# Patient Record
Sex: MALE | Race: OTHER | Employment: OTHER | URBAN - METROPOLITAN AREA
[De-identification: names, ages, dates, MRNs, and addresses within clinical notes are randomized per-mention and may not be internally consistent; named-entity substitution may affect disease eponyms.]

---

## 2017-09-15 ENCOUNTER — APPOINTMENT (OUTPATIENT)
Dept: CT IMAGING | Age: 27
End: 2017-09-15
Attending: PHYSICIAN ASSISTANT
Payer: OTHER GOVERNMENT

## 2017-09-15 ENCOUNTER — HOSPITAL ENCOUNTER (EMERGENCY)
Age: 27
Discharge: HOME | End: 2017-09-15
Attending: FAMILY MEDICINE
Payer: OTHER GOVERNMENT

## 2017-09-15 VITALS
HEIGHT: 69 IN | OXYGEN SATURATION: 99 % | SYSTOLIC BLOOD PRESSURE: 136 MMHG | BODY MASS INDEX: 27.7 KG/M2 | WEIGHT: 187 LBS | HEART RATE: 81 BPM | TEMPERATURE: 98.4 F | RESPIRATION RATE: 16 BRPM | DIASTOLIC BLOOD PRESSURE: 77 MMHG

## 2017-09-15 DIAGNOSIS — R10.84 ABDOMINAL PAIN, GENERALIZED: Primary | ICD-10-CM

## 2017-09-15 DIAGNOSIS — R51.9 ACUTE NONINTRACTABLE HEADACHE, UNSPECIFIED HEADACHE TYPE: ICD-10-CM

## 2017-09-15 LAB
ALBUMIN SERPL-MCNC: 4.1 G/DL (ref 3.4–5)
ALBUMIN/GLOB SERPL: 1.1 {RATIO} (ref 0.8–1.7)
ALP SERPL-CCNC: 60 U/L (ref 45–117)
ALT SERPL-CCNC: 35 U/L (ref 16–61)
ANION GAP SERPL CALC-SCNC: 6 MMOL/L (ref 3–18)
APPEARANCE UR: CLEAR
AST SERPL-CCNC: 22 U/L (ref 15–37)
BASOPHILS # BLD: 0 K/UL (ref 0–0.06)
BASOPHILS NFR BLD: 0 % (ref 0–2)
BILIRUB SERPL-MCNC: 0.4 MG/DL (ref 0.2–1)
BILIRUB UR QL: NEGATIVE
BUN SERPL-MCNC: 14 MG/DL (ref 7–18)
BUN/CREAT SERPL: 13 (ref 12–20)
CALCIUM SERPL-MCNC: 9.5 MG/DL (ref 8.5–10.1)
CHLORIDE SERPL-SCNC: 102 MMOL/L (ref 100–108)
CO2 SERPL-SCNC: 32 MMOL/L (ref 21–32)
COLOR UR: YELLOW
CREAT SERPL-MCNC: 1.05 MG/DL (ref 0.6–1.3)
DIFFERENTIAL METHOD BLD: NORMAL
EOSINOPHIL # BLD: 0.1 K/UL (ref 0–0.4)
EOSINOPHIL NFR BLD: 2 % (ref 0–5)
ERYTHROCYTE [DISTWIDTH] IN BLOOD BY AUTOMATED COUNT: 13.6 % (ref 11.6–14.5)
GLOBULIN SER CALC-MCNC: 3.7 G/DL (ref 2–4)
GLUCOSE SERPL-MCNC: 96 MG/DL (ref 74–99)
GLUCOSE UR STRIP.AUTO-MCNC: NEGATIVE MG/DL
HCT VFR BLD AUTO: 45.8 % (ref 36–48)
HGB BLD-MCNC: 15.8 G/DL (ref 13–16)
HGB UR QL STRIP: NEGATIVE
KETONES UR QL STRIP.AUTO: NEGATIVE MG/DL
LEUKOCYTE ESTERASE UR QL STRIP.AUTO: NEGATIVE
LYMPHOCYTES # BLD: 1.8 K/UL (ref 0.9–3.6)
LYMPHOCYTES NFR BLD: 24 % (ref 21–52)
MCH RBC QN AUTO: 29.2 PG (ref 24–34)
MCHC RBC AUTO-ENTMCNC: 34.5 G/DL (ref 31–37)
MCV RBC AUTO: 84.5 FL (ref 74–97)
MONOCYTES # BLD: 0.8 K/UL (ref 0.05–1.2)
MONOCYTES NFR BLD: 10 % (ref 3–10)
NEUTS SEG # BLD: 4.8 K/UL (ref 1.8–8)
NEUTS SEG NFR BLD: 64 % (ref 40–73)
NITRITE UR QL STRIP.AUTO: NEGATIVE
PH UR STRIP: 7 [PH] (ref 5–8)
PLATELET # BLD AUTO: 195 K/UL (ref 135–420)
PMV BLD AUTO: 11.6 FL (ref 9.2–11.8)
POTASSIUM SERPL-SCNC: 4.2 MMOL/L (ref 3.5–5.5)
PROT SERPL-MCNC: 7.8 G/DL (ref 6.4–8.2)
PROT UR STRIP-MCNC: NEGATIVE MG/DL
RBC # BLD AUTO: 5.42 M/UL (ref 4.7–5.5)
SODIUM SERPL-SCNC: 140 MMOL/L (ref 136–145)
SP GR UR REFRACTOMETRY: 1.02 (ref 1–1.03)
UROBILINOGEN UR QL STRIP.AUTO: 0.2 EU/DL (ref 0.2–1)
WBC # BLD AUTO: 7.4 K/UL (ref 4.6–13.2)

## 2017-09-15 PROCEDURE — 80053 COMPREHEN METABOLIC PANEL: CPT | Performed by: PHYSICIAN ASSISTANT

## 2017-09-15 PROCEDURE — 96375 TX/PRO/DX INJ NEW DRUG ADDON: CPT

## 2017-09-15 PROCEDURE — 85025 COMPLETE CBC W/AUTO DIFF WBC: CPT | Performed by: PHYSICIAN ASSISTANT

## 2017-09-15 PROCEDURE — 81003 URINALYSIS AUTO W/O SCOPE: CPT | Performed by: EMERGENCY MEDICINE

## 2017-09-15 PROCEDURE — 99284 EMERGENCY DEPT VISIT MOD MDM: CPT

## 2017-09-15 PROCEDURE — 74177 CT ABD & PELVIS W/CONTRAST: CPT

## 2017-09-15 PROCEDURE — 96374 THER/PROPH/DIAG INJ IV PUSH: CPT

## 2017-09-15 PROCEDURE — 96361 HYDRATE IV INFUSION ADD-ON: CPT

## 2017-09-15 PROCEDURE — 74011636320 HC RX REV CODE- 636/320: Performed by: FAMILY MEDICINE

## 2017-09-15 PROCEDURE — 74011250636 HC RX REV CODE- 250/636: Performed by: PHYSICIAN ASSISTANT

## 2017-09-15 RX ORDER — ONDANSETRON 2 MG/ML
4 INJECTION INTRAMUSCULAR; INTRAVENOUS
Status: COMPLETED | OUTPATIENT
Start: 2017-09-15 | End: 2017-09-15

## 2017-09-15 RX ORDER — DICYCLOMINE HYDROCHLORIDE 10 MG/1
10 CAPSULE ORAL 4 TIMES DAILY
Qty: 20 CAP | Refills: 0 | Status: SHIPPED | OUTPATIENT
Start: 2017-09-15 | End: 2017-09-20

## 2017-09-15 RX ORDER — KETOROLAC TROMETHAMINE 30 MG/ML
30 INJECTION, SOLUTION INTRAMUSCULAR; INTRAVENOUS
Status: COMPLETED | OUTPATIENT
Start: 2017-09-15 | End: 2017-09-15

## 2017-09-15 RX ADMIN — SODIUM CHLORIDE 1000 ML: 900 INJECTION, SOLUTION INTRAVENOUS at 16:15

## 2017-09-15 RX ADMIN — ONDANSETRON HYDROCHLORIDE 4 MG: 2 INJECTION INTRAMUSCULAR; INTRAVENOUS at 16:15

## 2017-09-15 RX ADMIN — KETOROLAC TROMETHAMINE 30 MG: 30 INJECTION, SOLUTION INTRAMUSCULAR; INTRAVENOUS at 16:15

## 2017-09-15 RX ADMIN — IOPAMIDOL 100 ML: 612 INJECTION, SOLUTION INTRAVENOUS at 17:43

## 2017-09-15 NOTE — LETTER
[facility] [place] 
[facility] 
[address] [address] 
[phone] Work/School Note Date: 9/15/2017 To Whom It May concern: 
 
Rylie Aguilar was seen and treated today in the emergency room by the following provider(s): 
Attending Provider: Noemi Feldman MD 
Physician Assistant: Chelle Louise. Rylie Aguilar is restricted to quarters until 9/17/17 Sincerely, 
 
 
 
 
Jalil Choi PA-C

## 2017-09-15 NOTE — ED PROVIDER NOTES
[address]  EMERGENCY DEPARTMENT HISTORY AND PHYSICAL EXAM       Date: 9/15/2017   Patient Name: Wilfrido Wheat   YOB: 1990  Medical Record Number: [id number]    History of Presenting Illness     Chief Complaint   Patient presents with    Abdominal Pain    Headache        History Provided By:  patient    Additional History: 4:02 PM  Wilfrido Wheat is a 31 y.o. male presenting to the ED via EMS C/O constant lower abdominal pain onset 2 weeks ago. Associated sxs include abdominal pain, passing flatus, nausea, and occasional diarrhea. Pt was seen for the abdominal pain 3 days ago at [facility] on [address] [place] and has a follow up for blood work next week. Pt also complains of a HA this morning. NKDA. Pt denies dysuria, testicular pain, hematuria, urinary frequency, decreased urine, sore throat, fever, cough, congestion, recent travel in over 2 years, and any other symptoms or complaints at this time. Primary Care Provider: None   Specialist:    Past History     Past Medical History:   History reviewed. No pertinent past medical history. Past Surgical History:   History reviewed. No pertinent surgical history. Family History:   History reviewed. No pertinent family history. Social History:   Social History   Substance Use Topics    Smoking status: Never Smoker    Smokeless tobacco: Never Used    Alcohol use No        Allergies:   No Known Allergies     Review of Systems   Review of Systems   Constitutional: Negative for fever. HENT: Negative for congestion and sore throat. Respiratory: Negative for cough. Gastrointestinal: Positive for abdominal pain, diarrhea and nausea. Genitourinary: Negative for decreased urine volume, dysuria, frequency, hematuria and testicular pain. Neurological: Positive for headaches. All other systems reviewed and are negative.       Physical Exam  Vitals:    09/15/17 1438   BP: 136/77   Pulse: 81   Resp: 16   Temp: 98.4 °F (36.9 °C)   SpO2: 99%   Weight: 84.8 kg (187 lb)   Height: 5' 9\" (1.753 m)       Physical Exam   Nursing note and vitals reviewed. Vital signs and nursing notes reviewed. CONSTITUTIONAL: Alert. Well-appearing; well-nourished; in no apparent distress. HEAD: Normocephalic; atraumatic. EYES: PERRL; Conjunctiva clear. ENT: Moist mucus membranes. NECK: Supple; FROM without difficulty, non-tender; no cervical lymphadenopathy. CV: Normal S1, S2; no murmurs, rubs, or gallops. No chest wall tenderness. RESPIRATORY: Normal chest excursion with respiration; breath sounds clear and equal bilaterally; no wheezes, rhonchi, or rales. GI: Normal bowel sounds; non-distended; Mild RUQ and RLQ tenderness; no guarding or rigidity; no palpable organomegaly. No CVA tenderness. BACK:  No evidence of trauma or deformity. Non-tender to palpation. FROM without difficulty. Negative straight leg raise bilaterally. EXT: Normal ROM in all four extremities; non-tender to palpation. SKIN: Normal for age and race; warm; dry; good turgor; no apparent lesions or exudate. NEURO: A & O x3. Cranial nerves 2-12 intact. Motor 5/5 bilaterally. Sensation intact. PSYCH:  Mood and affect appropriate. Diagnostic Study Results     Labs -      No results found for this or any previous visit (from the past 12 hour(s)). Radiologic Studies -  The following have been ordered and reviewed:   CT ABD PELV W CONT   Final Result   IMPRESSION:     1. No acute process     5 mm nodule left lower lobe. Follow-up as per Fleischner Society protocol.     This finding can be followed as per Fleischner Society Pulmonary Nodule  Guidelines:     Nodule >4-6 mm:  -Low risk for lung cancer: Followup chest CT in 12 months. If stable at that  time, no further followup.  -High risk for lung cancer: Initial followup chest CT in 6-12 months. Additional  followup at 18-24 months if the nodule remains stable.     As read by the radiologist.       Medical Decision Making   I am the first provider for this patient. I reviewed the vital signs, available nursing notes, past medical history, past surgical history, family history and social history. Vital Signs-Reviewed the patient's vital signs. No data found. Pulse Oximetry Analysis - Normal 99% on RA. Old Medical Records: Nursing notes. Procedures:   Procedures    ED Course:  4:02 PM  Initial assessment performed. The patients presenting problems have been discussed, and they are in agreement with the care plan formulated and outlined with them. I have encouraged them to ask questions as they arise throughout their visit. Medications Given in the ED:  Medications   sodium chloride 0.9 % bolus infusion 1,000 mL (0 mL IntraVENous IV Completed 9/15/17 1715)   ketorolac (TORADOL) injection 30 mg (30 mg IntraVENous Given 9/15/17 1615)   ondansetron (ZOFRAN) injection 4 mg (4 mg IntraVENous Given 9/15/17 1615)   iopamidol (ISOVUE 300) 61 % contrast injection 100 mL (100 mL IntraVENous Given 9/15/17 1743)       Discharge Note:  6:38 PM  Pt has been reexamined. Patient has no new complaints, changes, or physical findings. Care plan outlined and precautions discussed. Results were reviewed with the patient. All medications were reviewed with the patient; will d/c home with Bentyl. All of pt's questions and concerns were addressed. Patient was instructed and agrees to follow up with PCP, as well as to return to the ED upon further deterioration. Patient is ready to go home. Diagnosis   Clinical Impression:   1. Abdominal pain, generalized    2.  Acute nonintractable headache, unspecified headache type         Follow-up Information     Follow up With Details Comments Contact Info    RADHA Schedule an appointment as soon as possible for a visit in 2 days For primary care follow up. [phone]    THE [facility] EMERGENCY DEPT Go to As needed, If symptoms worsen [address] [address]  [phone]          Discharge Medication List as of 9/15/2017  6:35 PM      START taking these medications    Details   dicyclomine (BENTYL) 10 mg capsule Take 1 Cap by mouth four (4) times daily for 5 days. , Print, Disp-20 Cap, R-0             _______________________________   Attestations: This note is prepared by Nevaeh Bahena, acting as a Scribe for [employer] on 3:49 PM on 9/15/2017 . [employer]: The scribe's documentation has been prepared under my direction and personally reviewed by me in its entirety.   _______________________________

## 2017-09-15 NOTE — DISCHARGE INSTRUCTIONS
Abdominal Pain: Care Instructions  Your Care Instructions    Abdominal pain has many possible causes. Some aren't serious and get better on their own in a few days. Others need more testing and treatment. If your pain continues or gets worse, you need to be rechecked and may need more tests to find out what is wrong. You may need surgery to correct the problem. Don't ignore new symptoms, such as fever, nausea and vomiting, urination problems, pain that gets worse, and dizziness. These may be signs of a more serious problem. Your doctor may have recommended a follow-up visit in the next 8 to 12 hours. If you are not getting better, you may need more tests or treatment. The doctor has checked you carefully, but problems can develop later. If you notice any problems or new symptoms, get medical treatment right away. Follow-up care is a key part of your treatment and safety. Be sure to make and go to all appointments, and call your doctor if you are having problems. It's also a good idea to know your test results and keep a list of the medicines you take. How can you care for yourself at home? · Rest until you feel better. · To prevent dehydration, drink plenty of fluids, enough so that your urine is light yellow or clear like water. Choose water and other caffeine-free clear liquids until you feel better. If you have kidney, heart, or liver disease and have to limit fluids, talk with your doctor before you increase the amount of fluids you drink. · If your stomach is upset, eat mild foods, such as rice, dry toast or crackers, bananas, and applesauce. Try eating several small meals instead of two or three large ones. · Wait until 48 hours after all symptoms have gone away before you have spicy foods, alcohol, and drinks that contain caffeine. · Do not eat foods that are high in fat. · Avoid anti-inflammatory medicines such as aspirin, ibuprofen (Advil, Motrin), and naproxen (Aleve).  These can cause stomach upset. Talk to your doctor if you take daily aspirin for another health problem. When should you call for help? Call 911 anytime you think you may need emergency care. For example, call if:  · You passed out (lost consciousness). · You pass maroon or very bloody stools. · You vomit blood or what looks like coffee grounds. · You have new, severe belly pain. Call your doctor now or seek immediate medical care if:  · Your pain gets worse, especially if it becomes focused in one area of your belly. · You have a new or higher fever. · Your stools are black and look like tar, or they have streaks of blood. · You have unexpected vaginal bleeding. · You have symptoms of a urinary tract infection. These may include:  ¨ Pain when you urinate. ¨ Urinating more often than usual.  ¨ Blood in your urine. · You are dizzy or lightheaded, or you feel like you may faint. Watch closely for changes in your health, and be sure to contact your doctor if:  · You are not getting better after 1 day (24 hours). Where can you learn more? Go to http://jackieiSitesjaved.info/. Enter U192 in the search box to learn more about \"Abdominal Pain: Care Instructions. \"  Current as of: March 20, 2017  Content Version: 11.3  © 0540-0981 [employer]. Care instructions adapted under license by [employer] (which disclaims liability or warranty for this information). If you have questions about a medical condition or this instruction, always ask your healthcare professional. [place] [address] any warranty or liability for your use of this information. Headache: Care Instructions  Your Care Instructions    Headaches have many possible causes. Most headaches aren't a sign of a more serious problem, and they will get better on their own. Home treatment may help you feel better faster. The doctor has checked you carefully, but problems can develop later.  If you notice any problems or new symptoms, get medical treatment right away. Follow-up care is a key part of your treatment and safety. Be sure to make and go to all appointments, and call your doctor if you are having problems. It's also a good idea to know your test results and keep a list of the medicines you take. How can you care for yourself at home? · Do not drive if you have taken a prescription pain medicine. · Rest in a quiet, dark room until your headache is gone. Close your eyes and try to relax or go to sleep. Don't watch TV or read. · Put a cold, moist cloth or cold pack on the painful area for 10 to 20 minutes at a time. Put a thin cloth between the cold pack and your skin. · Use a warm, moist towel or a heating pad set on low to relax tight shoulder and neck muscles. · Have someone gently massage your neck and shoulders. · Take pain medicines exactly as directed. ¨ If the doctor gave you a prescription medicine for pain, take it as prescribed. ¨ If you are not taking a prescription pain medicine, ask your doctor if you can take an over-the-counter medicine. · Be careful not to take pain medicine more often than the instructions allow, because you may get worse or more frequent headaches when the medicine wears off. · Do not ignore new symptoms that occur with a headache, such as a fever, weakness or numbness, vision changes, or confusion. These may be signs of a more serious problem. To prevent headaches  · Keep a headache diary so you can figure out what triggers your headaches. Avoiding triggers may help you prevent headaches. Record when each headache began, how long it lasted, and what the pain was like (throbbing, aching, stabbing, or dull). Write down any other symptoms you had with the headache, such as nausea, flashing lights or dark spots, or sensitivity to bright light or loud noise. Note if the headache occurred near your period.  List anything that might have triggered the headache, such as certain foods (chocolate, cheese, wine) or odors, smoke, bright light, stress, or lack of sleep. · Find healthy ways to deal with stress. Headaches are most common during or right after stressful times. Take time to relax before and after you do something that has caused a headache in the past.  · Try to keep your muscles relaxed by keeping good posture. Check your jaw, face, neck, and shoulder muscles for tension, and try relaxing them. When sitting at a desk, change positions often, and stretch for 30 seconds each hour. · Get plenty of sleep and exercise. · Eat regularly and well. Long periods without food can trigger a headache. · Treat yourself to a massage. Some people find that regular massages are very helpful in relieving tension. · Limit caffeine by not drinking too much coffee, tea, or soda. But don't quit caffeine suddenly, because that can also give you headaches. · Reduce eyestrain from computers by blinking frequently and looking away from the computer screen every so often. Make sure you have proper eyewear and that your monitor is set up properly, about an arm's length away. · Seek help if you have depression or anxiety. Your headaches may be linked to these conditions. Treatment can both prevent headaches and help with symptoms of anxiety or depression. When should you call for help? Call 911 anytime you think you may need emergency care. For example, call if:  · You have signs of a stroke. These may include:  ¨ Sudden numbness, paralysis, or weakness in your face, arm, or leg, especially on only one side of your body. ¨ Sudden vision changes. ¨ Sudden trouble speaking. ¨ Sudden confusion or trouble understanding simple statements. ¨ Sudden problems with walking or balance. ¨ A sudden, severe headache that is different from past headaches. Call your doctor now or seek immediate medical care if:  · You have a new or worse headache. · Your headache gets much worse.   Where can you learn more?  Go to http://jackie-javed.info/. Enter M271 in the search box to learn more about \"Headache: Care Instructions. \"  Current as of: October 14, 2016  Content Version: 11.3  © 6730-9094 [employer]. Care instructions adapted under license by [employer] (which disclaims liability or warranty for this information). If you have questions about a medical condition or this instruction, always ask your healthcare professional. [address] any warranty or liability for your use of this information.

## 2017-09-15 NOTE — ED TRIAGE NOTES
C/o headache and abdominal pain with \"gassiness' and constipation. Sepsis Screening completed    (  )Patient meets SIRS criteria.   (  )Patient does not meet SIRS criteria.  x    SIRS Criteria is achieved when two or more of the following are present   Temperature < 96.8°F (36°C) or > 100.9°F (38.3°C)   Heart Rate > 90 beats per minute   Respiratory Rate > 20 breaths per minute   WBC count > 12,000 or <4,000 or > 10% bands